# Patient Record
Sex: FEMALE | Race: WHITE | NOT HISPANIC OR LATINO | Employment: UNEMPLOYED | URBAN - METROPOLITAN AREA
[De-identification: names, ages, dates, MRNs, and addresses within clinical notes are randomized per-mention and may not be internally consistent; named-entity substitution may affect disease eponyms.]

---

## 2017-11-09 ENCOUNTER — HOSPITAL ENCOUNTER (EMERGENCY)
Facility: HOSPITAL | Age: 52
Discharge: HOME | End: 2017-11-09
Admitting: EMERGENCY MEDICINE

## 2017-11-09 ENCOUNTER — APPOINTMENT (OUTPATIENT)
Dept: GENERAL RADIOLOGY | Facility: HOSPITAL | Age: 52
End: 2017-11-09

## 2017-11-09 VITALS
RESPIRATION RATE: 18 BRPM | DIASTOLIC BLOOD PRESSURE: 69 MMHG | BODY MASS INDEX: 24.99 KG/M2 | SYSTOLIC BLOOD PRESSURE: 122 MMHG | HEART RATE: 74 BPM | OXYGEN SATURATION: 99 % | WEIGHT: 150 LBS | HEIGHT: 65 IN | TEMPERATURE: 98.4 F

## 2017-11-09 DIAGNOSIS — F32.A DEPRESSION, UNSPECIFIED DEPRESSION TYPE: ICD-10-CM

## 2017-11-09 DIAGNOSIS — Z76.0 MEDICATION REFILL: ICD-10-CM

## 2017-11-09 DIAGNOSIS — J06.9 UPPER RESPIRATORY TRACT INFECTION, UNSPECIFIED TYPE: Primary | ICD-10-CM

## 2017-11-09 LAB
FLUAV AG NPH QL: NEGATIVE
FLUBV AG NPH QL IA: NEGATIVE
S PYO AG THROAT QL: NEGATIVE

## 2017-11-09 PROCEDURE — 71020 HC CHEST PA AND LATERAL: CPT

## 2017-11-09 PROCEDURE — 87804 INFLUENZA ASSAY W/OPTIC: CPT | Performed by: PHYSICIAN ASSISTANT

## 2017-11-09 PROCEDURE — 87081 CULTURE SCREEN ONLY: CPT | Performed by: PHYSICIAN ASSISTANT

## 2017-11-09 PROCEDURE — 87880 STREP A ASSAY W/OPTIC: CPT | Performed by: PHYSICIAN ASSISTANT

## 2017-11-09 PROCEDURE — 99283 EMERGENCY DEPT VISIT LOW MDM: CPT

## 2017-11-09 PROCEDURE — 99284 EMERGENCY DEPT VISIT MOD MDM: CPT | Performed by: PHYSICIAN ASSISTANT

## 2017-11-09 RX ORDER — VILAZODONE HYDROCHLORIDE 20 MG/1
20 TABLET ORAL DAILY
Qty: 7 TABLET | Refills: 0 | Status: SHIPPED | OUTPATIENT
Start: 2017-11-09

## 2017-11-09 RX ORDER — VILAZODONE HYDROCHLORIDE 20 MG/1
20 TABLET ORAL DAILY
COMMUNITY
End: 2017-11-09

## 2017-11-09 RX ORDER — TRAZODONE HYDROCHLORIDE 150 MG/1
150 TABLET ORAL NIGHTLY
Qty: 7 TABLET | Refills: 0 | Status: SHIPPED | OUTPATIENT
Start: 2017-11-09

## 2017-11-09 RX ORDER — GUAIFENESIN 600 MG/1
600 TABLET, EXTENDED RELEASE ORAL ONCE
Status: COMPLETED | OUTPATIENT
Start: 2017-11-09 | End: 2017-11-09

## 2017-11-09 RX ORDER — BENZONATATE 100 MG/1
100 CAPSULE ORAL ONCE
Status: COMPLETED | OUTPATIENT
Start: 2017-11-09 | End: 2017-11-09

## 2017-11-09 RX ORDER — BENZONATATE 100 MG/1
100 CAPSULE ORAL 3 TIMES DAILY PRN
Qty: 20 CAPSULE | Refills: 0 | Status: SHIPPED | OUTPATIENT
Start: 2017-11-09 | End: 2017-11-16

## 2017-11-09 RX ORDER — TRAZODONE HYDROCHLORIDE 150 MG/1
150 TABLET ORAL NIGHTLY
COMMUNITY
End: 2017-11-09

## 2017-11-09 RX ORDER — FLUOXETINE HYDROCHLORIDE 20 MG/1
20 CAPSULE ORAL DAILY
COMMUNITY
End: 2017-11-09

## 2017-11-09 RX ORDER — FLUOXETINE HYDROCHLORIDE 20 MG/1
20 CAPSULE ORAL DAILY
Qty: 7 CAPSULE | Refills: 0 | Status: SHIPPED | OUTPATIENT
Start: 2017-11-09

## 2017-11-09 RX ADMIN — GUAIFENESIN 600 MG: 600 TABLET, EXTENDED RELEASE ORAL at 15:25

## 2017-11-09 RX ADMIN — BENZONATATE 100 MG: 100 CAPSULE ORAL at 15:25

## 2017-11-09 NOTE — ED NOTES
Went over discharge instructions with patient, alert and oriented at time of discharge, no questions at this time. Left ambulatory.       Josie Galicia RN  11/09/17 9157

## 2017-11-09 NOTE — ED PROVIDER NOTES
Subjective   History of Present Illness  History of Present Illness    Chief complaint: Runny nose    Location: Nose    Quality/Severity:  Clear drainage, mild    Timing/Duration: 3 days    Modifying Factors: Nothing specific makes worse or better.  Patient has been taking over-the-counter flu medicine without relief    Associated Symptoms: Positive cough with clear sputum.  Denies fevers but does have chills.  Positive sore throat.  Denies ear pain.  Denies headache.  Denies neck pain.  Denies chest pain.  Denies nausea or vomiting.    Narrative: 51-year-old female presents with runny nose for 3 days.  She denies any sick contacts.  She denies any recent travel.  She does not have any shortness of breath or chest pain.  She is a tobacco abuser.    Review of Systems  General: Denies fevers or chills.  Denies any weakness or fatigue.  Denies any weight loss or weight gain.  SKIN: Denies any rashes lesions or ulcers.  Denies color change.    HEENT:  As above. Denies any change in vision.  LUNGS: Denies any shortness of breath or wheezing.    CARDIAC: Denies any chest pain.  Denies palpitations.  Denies syncope.  Denies any edema  ABD: Denies any abdominal pain.  Denies any nausea or vomiting or diarrhea.    : Denies any dysuria, urgency, frequency or hematuria.    NEURO: Denies any weakness.  Denies headache.  Denies seizures.  Denies changes in speech or difficulty walking.  M/S: Denies arthralgias, back pain, myalgias or neck pain  PSYCH: Negative for suicidal ideas. Denies anxiety or depression   review was performed in addition to those in the above all other reviews are negative.    Past Medical History:   Diagnosis Date   • Anxiety    • Depression    • Migraine        No Known Allergies    Past Surgical History:   Procedure Laterality Date   • ANKLE ARTHROPLASTY     •  SECTION      x3   • TONSILLECTOMY         History reviewed. No pertinent family history.    Social History     Social History   •  Marital status: Unknown     Spouse name: N/A   • Number of children: N/A   • Years of education: N/A     Social History Main Topics   • Smoking status: Current Every Day Smoker     Packs/day: 1.00     Years: 20.00     Types: Cigarettes   • Smokeless tobacco: Never Used   • Alcohol use Yes      Comment: occassionally   • Drug use: No   • Sexual activity: Not Asked     Other Topics Concern   • None     Social History Narrative   • None     No current facility-administered medications on file prior to encounter.      No current outpatient prescriptions on file prior to encounter.   No current facility-administered medications for this encounter.     Current Outpatient Prescriptions:   •  FLUoxetine (PROzac) 20 MG capsule, Take 20 mg by mouth Daily., Disp: , Rfl:   •  traZODone (DESYREL) 150 MG tablet, Take 150 mg by mouth Every Night., Disp: , Rfl:   •  vilazodone (VIIBRYD) 20 MG tablet tablet, Take 20 mg by mouth Daily., Disp: , Rfl:             Objective   Physical Exam  Vitals:    11/09/17 1430   BP: 118/67   Pulse: 80   Resp: 16   Temp: 98.2 °F (36.8 °C)   SpO2: 98%     GENERAL: Alert and oriented ×4.  No apparent distress.  SKIN: Warm, pink and dry  HEENT: TMs clear bilaterally.  Oropharynx clear without erythema, edema or exudate.  No lymphadenopathy. PERRLA, EOMI  LUNGS: Clear to auscultation bilaterally without wheezes, rales or rhonchi  CARDIAC: Regular rate and rhythm.  S1 and S2.  No murmurs, rubs or gallops.  ABD: Soft, nontender  M/S: MAEW, no deformity  PSYCH: Normal mood and affect    Procedures         ED Course  ED Course    >3 min spent on smoking cessation.  Tessalon, mucinex given.    Results for orders placed or performed during the hospital encounter of 11/09/17   Influenza Antigen, Rapid - Swab, Nasopharynx   Result Value Ref Range    Influenza A Ag, EIA Negative Negative    Influenza B Ag, EIA Negative Negative   Rapid Strep A Screen - Swab, Throat   Result Value Ref Range    Strep A Ag Negative  Negative   1530- RN tells me pt is also here for medication refill on all home meds.     Reviewed CXR. Independently viewed by me. Interpreted by radiologist. Discussed with pt.  Xr Chest 2 View    Result Date: 11/9/2017  Narrative: CHEST X-RAY, 11/09/2017:  HISTORY: 51-year-old female in the ED complaining of three day history of cough and shortness of air.  TECHNIQUE: PA and lateral upright chest series.  FINDINGS: The examination is negative. The lungs are expanded and clear with no visible pulmonary infiltrate or pleural effusion. Heart size and pulmonary vascularity are normal.      Impression: Negative chest.  This report was finalized on 11/9/2017 3:35 PM by Dr. Orion Pina MD.      Education Given.  Patient has been instructed to follow up with primary care.  She is been given a list of local doctors in the area.  Discharged with Tessalon and one week of refills of her regular medications.              MDM  Number of Diagnoses or Management Options  Depression, unspecified depression type: established and worsening  Medication refill: new and does not require workup  Upper respiratory tract infection, unspecified type: new and requires workup     Amount and/or Complexity of Data Reviewed  Clinical lab tests: reviewed and ordered  Tests in the radiology section of CPT®: ordered and reviewed  Tests in the medicine section of CPT®: ordered and reviewed  Independent visualization of images, tracings, or specimens: yes    Risk of Complications, Morbidity, and/or Mortality  Presenting problems: low  Diagnostic procedures: low  Management options: low    Patient Progress  Patient progress: improved      Final diagnoses:   Upper respiratory tract infection, unspecified type   Depression, unspecified depression type   Medication refill     Dictated utilizing Dragon dictation           Marielena Bustamante PA-C  11/09/17 1546

## 2017-11-11 LAB — BACTERIA SPEC AEROBE CULT: NORMAL
